# Patient Record
Sex: MALE | Race: WHITE | NOT HISPANIC OR LATINO | URBAN - METROPOLITAN AREA
[De-identification: names, ages, dates, MRNs, and addresses within clinical notes are randomized per-mention and may not be internally consistent; named-entity substitution may affect disease eponyms.]

---

## 2019-04-27 ENCOUNTER — EMERGENCY (EMERGENCY)
Facility: HOSPITAL | Age: 39
LOS: 1 days | Discharge: HOME | End: 2019-04-27
Attending: EMERGENCY MEDICINE | Admitting: EMERGENCY MEDICINE
Payer: SELF-PAY

## 2019-04-27 VITALS
HEART RATE: 81 BPM | OXYGEN SATURATION: 98 % | SYSTOLIC BLOOD PRESSURE: 128 MMHG | TEMPERATURE: 99 F | RESPIRATION RATE: 17 BRPM | DIASTOLIC BLOOD PRESSURE: 68 MMHG

## 2019-04-27 PROCEDURE — 99285 EMERGENCY DEPT VISIT HI MDM: CPT

## 2019-04-27 PROCEDURE — 99283 EMERGENCY DEPT VISIT LOW MDM: CPT

## 2019-04-27 PROCEDURE — 82962 GLUCOSE BLOOD TEST: CPT

## 2019-04-27 NOTE — ED ADULT NURSE NOTE - OBJECTIVE STATEMENT
Pt intoxicated on arrival. Alert to self, following commands, VSS. Pt denies any fevers, no lightheadedness, no dizziness, no headache, no cp, no sob, no n/v no changes to bowels, no urinary complaints.

## 2019-04-27 NOTE — ED PROVIDER NOTE - NS ED ROS FT
Constitutional: No fever or chills.   Eyes: No pain, blurry vision, or discharge.  ENMT: No hearing changes, pain, discharge or infections. No neck pain or stiffness.  Cardiac: No chest pain, SOB or edema. No chest pain with exertion.  Respiratory: No cough or respiratory distress. No hemoptysis. No history of asthma or RAD.  GI: No nausea, vomiting, diarrhea or abdominal pain.  : No dysuria, frequency or burning.  MS: No myalgia, muscle weakness, joint pain or back pain.  Neuro: No headache or weakness. No LOC.  Skin: No skin rash.   Endocrine: No history of thyroid disease or diabetes.  Except as documented in the HPI, all other systems are negative.

## 2019-04-27 NOTE — ED PROVIDER NOTE - PROGRESS NOTE DETAILS
Pt was visualized by RN staff walking to and from the bathroom. Pt then eloped from the dept with steady gait

## 2019-04-27 NOTE — ED PROVIDER NOTE - CLINICAL SUMMARY MEDICAL DECISION MAKING FREE TEXT BOX
Alcohol intoxication, admits to drinking. No signs of trauma. FS stable in the field and on arrival. Feed. Observe for sobriety.

## 2019-04-27 NOTE — ED PROVIDER NOTE - PHYSICAL EXAMINATION
Constitutional: Disheveled, awake, alert, oriented to person, place, time/situation and in no apparent distress. alcohol on breath  Head atraumatic, normocephalic. No signs of trauma  ENMT: Airway patent. Normal MM  Eyes: Clear bilaterally, PERRL, EOMI  Cardiac: Normal rate, regular rhythm.  Heart sounds S1, S2.  No murmurs, rubs or gallops.  Respiratory: Breaths sounds equal and clear b/l. No increased WOB, tachypnea, hypoxia, or accessory mm use. Pt speaks in full sentences.   Gastrointestinal: Abd soft, NT, ND, NABS. No guarding, rebound, or rigidity. No pulsatile abdominal masses. No organomegaly appreciated. No CVAT   Musculoskeletal: Range of motion is not limited. FROM all joints x 4 ext w/o dec ROM, pain, or signs of trauma  Neuro: Alert and oriented x 3, face symmetric. Strength 5/5 x 4 ext and symmetric, nml gross motor movement, nml gait. No focal deficits noted.   Skin: Skin normal color for race, warm, dry and intact. No evidence of rash.  Psych: Alert and oriented to person, place, time/situation. normal mood and affect. intoxicated

## 2019-04-27 NOTE — ED ADULT NURSE NOTE - NSIMPLEMENTINTERV_GEN_ALL_ED
Implemented All Fall Risk Interventions:  [place] to call system. Call bell, personal items and telephone within reach. Instruct patient to call for assistance. Room bathroom lighting operational. Non-slip footwear when patient is off stretcher. Physically safe environment: no spills, clutter or unnecessary equipment. Stretcher in lowest position, wheels locked, appropriate side rails in place. Provide visual cue, wrist band, yellow gown, etc. Monitor gait and stability. Monitor for mental status changes and reorient to person, place, and time. Review medications for side effects contributing to fall risk. Reinforce activity limits and safety measures with patient and family.

## 2019-04-27 NOTE — ED ADULT TRIAGE NOTE - CHIEF COMPLAINT QUOTE
biba for alcohol intox.  .  [employer] called ems patient sleeping in sidewalk. No visible sign trauma / withdrawal noted.

## 2019-04-27 NOTE — ED ADULT NURSE NOTE - CHPI ED NUR SYMPTOMS NEG
no disorientation/no fever/no abdominal distension/no chills/no nausea/no weakness/no vomiting/no abdominal pain/no pain

## 2019-04-27 NOTE — ED PROVIDER NOTE - OBJECTIVE STATEMENT
Pt w/ PMHx Un domiciled, alcoholism BIBA for alcohol intoxication. Pt admits to daily alcohol use and states he was sleeping in the street. No trauma. No physical complaints. Denies drug use

## 2019-04-27 NOTE — ED ADULT NURSE REASSESSMENT NOTE - NS ED NURSE REASSESS COMMENT FT1
Pt. awoke, given cup of ice water. Pt. is A&Ox3, ambulating independently with steady gait. Pt. went to use ED restroom and walked out of ED.

## 2019-05-01 DIAGNOSIS — F10.120 ALCOHOL ABUSE WITH INTOXICATION, UNCOMPLICATED: ICD-10-CM

## 2019-05-01 DIAGNOSIS — R41.82 ALTERED MENTAL STATUS, UNSPECIFIED: ICD-10-CM

## 2019-05-10 ENCOUNTER — EMERGENCY (EMERGENCY)
Facility: HOSPITAL | Age: 39
LOS: 1 days | Discharge: HOME | End: 2019-05-10
Attending: EMERGENCY MEDICINE | Admitting: EMERGENCY MEDICINE
Payer: SELF-PAY

## 2019-05-10 VITALS
SYSTOLIC BLOOD PRESSURE: 148 MMHG | HEART RATE: 85 BPM | TEMPERATURE: 98 F | OXYGEN SATURATION: 96 % | RESPIRATION RATE: 16 BRPM | DIASTOLIC BLOOD PRESSURE: 78 MMHG

## 2019-05-10 VITALS
OXYGEN SATURATION: 97 % | RESPIRATION RATE: 16 BRPM | HEART RATE: 73 BPM | DIASTOLIC BLOOD PRESSURE: 69 MMHG | TEMPERATURE: 98 F | SYSTOLIC BLOOD PRESSURE: 128 MMHG

## 2019-05-10 DIAGNOSIS — F10.129 ALCOHOL ABUSE WITH INTOXICATION, UNSPECIFIED: ICD-10-CM

## 2019-05-10 DIAGNOSIS — F10.20 ALCOHOL DEPENDENCE, UNCOMPLICATED: ICD-10-CM

## 2019-05-10 PROCEDURE — 70450 CT HEAD/BRAIN W/O DYE: CPT | Mod: 26

## 2019-05-10 PROCEDURE — 99284 EMERGENCY DEPT VISIT MOD MDM: CPT

## 2019-05-10 RX ADMIN — Medication 100 MILLIGRAM(S): at 19:34

## 2019-05-10 NOTE — ED PROVIDER NOTE - CLINICAL SUMMARY MEDICAL DECISION MAKING FREE TEXT BOX
Patient here with apparent isolated EtOH intoxication. Will check CT head and observe until more awake, alert, steady and with a safe plan for d/c.

## 2019-05-10 NOTE — ED ADULT NURSE NOTE - OBJECTIVE STATEMENT
BIBA from street, pt found lying on sidewalk by bystanders who called 911. Pt sleepy, no n/v, responds to verbal, no s/s trauma, admits to excessive vodkla. + hx of similar ED visits. Fall precautions. Will monitor and f/u as indicated. BIBA from street, pt found lying on sidewalk out of his wheelchair by bystanders who called 911. Pt sleepy, no n/v, responds to verbal, no s/s trauma, admits to excessive vodka. + hx of similar ED visits. Fall precautions. Will monitor and f/u as indicated. BIBA from street, pt found lying on sidewalk out of his wheelchair by bystanders who called 911. Pt sleepy, no n/v, responds to verbal, no s/s trauma, admits to excessive vodka. + hx of similar ED visits. "I haven't had enough today!" Noted area of redness to right forearm, looks like a burn, pt unaware of how injury occurred, no neuro deficits. Fall precautions. Will monitor and f/u as indicated.

## 2019-05-10 NOTE — ED PROVIDER NOTE - PHYSICAL EXAMINATION
Const: Severe EtOH intox, poor hygiene, unkempt  ENT: Airway patent, protecting airway. Dried blood at nares, no facial edema or bruising.  MMM. No scalp hematoma and no apparent c-spine tenderness.  Eyes: Clear bilaterally, pupils equal, round 3mm  Cardiac: Normal rate, regular rhythm.  Heart sounds S1, S2.  No murmurs, rubs or gallops.  Resp: Breath sounds clear and equal bilaterally.  GI: Abdomen soft, appears non-tender, no guarding.  MSK: No signs of acute trauma or injury.   Neuro: Severe EtOH intox, DRAPER, normal tone, bon-verbal, moves and moans to sternal rub  Skin: No signs of acute trauma or injury.   Psych: Severe EtOH intox

## 2019-05-10 NOTE — ED ADULT NURSE REASSESSMENT NOTE - NS ED NURSE REASSESS COMMENT FT1
patient now more awake and alert ambulatory with steady gait sitting up and tolerating PO stable for dispo

## 2019-05-10 NOTE — ED ADULT NURSE NOTE - NSIMPLEMENTINTERV_GEN_ALL_ED
Implemented All Fall with Harm Risk Interventions:  [place] to call system. Call bell, personal items and telephone within reach. Instruct patient to call for assistance. Room bathroom lighting operational. Non-slip footwear when patient is off stretcher. Physically safe environment: no spills, clutter or unnecessary equipment. Stretcher in lowest position, wheels locked, appropriate side rails in place. Provide visual cue, wrist band, yellow gown, etc. Monitor gait and stability. Monitor for mental status changes and reorient to person, place, and time. Review medications for side effects contributing to fall risk. Reinforce activity limits and safety measures with patient and family. Provide visual clues: red socks.

## 2019-05-10 NOTE — ED ADULT NURSE NOTE - NSFALLRSKINDICTYPE_ED_ALL_ED
Disorientation/Intoxication Impaired Gait/Intoxication/Need for Mobility Assisted Device/Disorientation

## 2019-05-14 ENCOUNTER — EMERGENCY (EMERGENCY)
Facility: HOSPITAL | Age: 39
LOS: 1 days | Discharge: HOME | End: 2019-05-14
Attending: EMERGENCY MEDICINE | Admitting: EMERGENCY MEDICINE
Payer: SELF-PAY

## 2019-05-14 VITALS
SYSTOLIC BLOOD PRESSURE: 142 MMHG | OXYGEN SATURATION: 98 % | DIASTOLIC BLOOD PRESSURE: 86 MMHG | TEMPERATURE: 98 F | HEART RATE: 89 BPM | RESPIRATION RATE: 18 BRPM

## 2019-05-14 PROCEDURE — 99282 EMERGENCY DEPT VISIT SF MDM: CPT

## 2019-05-14 NOTE — ED ADULT NURSE NOTE - OBJECTIVE STATEMENT
37 y/o M who was BIBA for AMS. pt screaming at staff "I need treatement for DT's"- no tremors noted at this time. pt cursing and uncooperative. pt ambulating with steady gait. no head injuries or trauma noted. pt verbally abusive and threatening staff. security involved.

## 2019-05-14 NOTE — ED PROVIDER NOTE - NSFOLLOWUPINSTRUCTIONS_ED_ALL_ED_FT
Follow up with your primary care doctor or clinics listed below  [facility]  [address] [place] [id number]  [facility]  Address: [address] [place] [id number]   Return immediately for any new or worsening symptoms or any new concerns

## 2019-05-14 NOTE — ED PROVIDER NOTE - OBJECTIVE STATEMENT
38 yom bibems for possible intox.  pt admits to daily alcohol use and states he may be in DT and asking for benzo.  no pain.  no nvd.

## 2019-05-14 NOTE — ED PROVIDER NOTE - PROGRESS NOTE DETAILS
pt verbally abusive to staff, yelling that he's in DT, clinically no evidence of withdrawal or DT, clinically sober, when told that pt is not in DT or withdrawal and not candidate for benzo, pt became verbally abusive, steady gait exiting the ED, left prior to obtaining paperwork.  verbally given info that if he's concerned for withdrawal he can go to [facility] for detox.

## 2019-05-14 NOTE — ED ADULT TRIAGE NOTE - CHIEF COMPLAINT QUOTE
biba for AMS due to etoh. Patient unsteady on his feet and states he's seeking detox and 'needs help.' Patient;s face is puffy, unknown if this is chronic. No injuries noted. VS stable.

## 2019-05-14 NOTE — ED PROVIDER NOTE - CLINICAL SUMMARY MEDICAL DECISION MAKING FREE TEXT BOX
pt reports concern for DT, though on exam no evidence of withdrawal or DT, does not require any medications, concern for malingering, given instructions that [facility] is able to provide help for detox.  pt eloped prior to receiving discharge info.  appeared ao x 4 w/ steady gait at time of leaving the hospital.

## 2019-05-14 NOTE — ED ADULT NURSE NOTE - NSIMPLEMENTINTERV_GEN_ALL_ED
Implemented All Universal Safety Interventions:  [place] to call system. Call bell, personal items and telephone within reach. Instruct patient to call for assistance. Room bathroom lighting operational. Non-slip footwear when patient is off stretcher. Physically safe environment: no spills, clutter or unnecessary equipment. Stretcher in lowest position, wheels locked, appropriate side rails in place.

## 2019-05-14 NOTE — ED PROVIDER NOTE - PHYSICAL EXAMINATION
CON: ao x 4, HENMT: clear oropharynx, soft neck, HEAD: atraumatic, CV: rrr, RESP: cta b/l, no tachypnea, no hypoxia, no resp distress, MSK: no deformities, NEURO: ao x 4, steady gait, clear speech, no tremors, no rigidity,

## 2019-05-15 PROBLEM — F10.10 ALCOHOL ABUSE, UNCOMPLICATED: Chronic | Status: ACTIVE | Noted: 2019-05-10

## 2019-05-15 PROBLEM — Z59.0 HOMELESSNESS: Chronic | Status: ACTIVE | Noted: 2019-05-10

## 2019-05-18 DIAGNOSIS — F10.10 ALCOHOL ABUSE, UNCOMPLICATED: ICD-10-CM

## 2019-05-18 DIAGNOSIS — R41.82 ALTERED MENTAL STATUS, UNSPECIFIED: ICD-10-CM

## 2019-05-30 NOTE — ED ADULT NURSE NOTE - BREATH SOUNDS, MLM
Pt discharged via walking to home in car with mother in stable condition with belongings. Discharge instructions given. Discharge checklist reviewed and completed with pt and family. \"Meds To Beds\" medication at bedside. Pt denies having any further questions at this time  Locked up home medication(s)/personal items given to patient at discharge. Patient/family state they have everything they were admitted with. Clear

## 2019-09-30 ENCOUNTER — EMERGENCY (EMERGENCY)
Facility: HOSPITAL | Age: 39
LOS: 1 days | Discharge: HOME | End: 2019-09-30
Admitting: EMERGENCY MEDICINE

## 2019-09-30 VITALS
TEMPERATURE: 98 F | HEART RATE: 78 BPM | OXYGEN SATURATION: 95 % | SYSTOLIC BLOOD PRESSURE: 142 MMHG | DIASTOLIC BLOOD PRESSURE: 72 MMHG | RESPIRATION RATE: 18 BRPM

## 2019-09-30 NOTE — ED ADULT TRIAGE NOTE - CHIEF COMPLAINT QUOTE
pt. brought in by EMS for sleeping on the street, admits to drinking alcohol today, no visible injuries noted

## 2019-09-30 NOTE — ED ADULT NURSE NOTE - OBJECTIVE STATEMENT
pt. ambulating around room 8 requesting to leave, drinking alcohol from a clear bottle, pt. refusing to stay. In no acute distress

## 2019-10-05 DIAGNOSIS — R41.82 ALTERED MENTAL STATUS, UNSPECIFIED: ICD-10-CM

## 2022-06-08 NOTE — ED PROVIDER NOTE - PRINCIPAL DIAGNOSIS
Received call from a man stating the phone number [phone] does not belong to anyone named Negrito  - (I did remove the ph number from this patients demographics)    Alcoholic intoxication without complication